# Patient Record
Sex: FEMALE | Race: WHITE | NOT HISPANIC OR LATINO | Employment: OTHER | URBAN - METROPOLITAN AREA
[De-identification: names, ages, dates, MRNs, and addresses within clinical notes are randomized per-mention and may not be internally consistent; named-entity substitution may affect disease eponyms.]

---

## 2017-01-25 ENCOUNTER — APPOINTMENT (OUTPATIENT)
Dept: GENERAL RADIOLOGY | Facility: HOSPITAL | Age: 57
End: 2017-01-25

## 2017-01-25 ENCOUNTER — HOSPITAL ENCOUNTER (EMERGENCY)
Facility: HOSPITAL | Age: 57
Discharge: HOME | End: 2017-01-26
Attending: EMERGENCY MEDICINE | Admitting: EMERGENCY MEDICINE
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
## 2017-01-25 DIAGNOSIS — S16.1XXA CERVICAL STRAIN, INITIAL ENCOUNTER: Primary | ICD-10-CM

## 2017-01-25 PROCEDURE — 99283 EMERGENCY DEPT VISIT LOW MDM: CPT

## 2017-01-25 PROCEDURE — 72050 X-RAY EXAM NECK SPINE 4/5VWS: CPT

## 2017-01-25 RX ORDER — CYCLOBENZAPRINE HCL 10 MG
10 TABLET ORAL ONCE
Status: COMPLETED | OUTPATIENT
Start: 2017-01-25 | End: 2017-01-25

## 2017-01-25 RX ORDER — CYCLOBENZAPRINE HCL 10 MG
10 TABLET ORAL 3 TIMES DAILY PRN
Qty: 21 TABLET | Refills: 0 | Status: SHIPPED | OUTPATIENT
Start: 2017-01-25

## 2017-01-25 RX ORDER — OXYCODONE HYDROCHLORIDE AND ACETAMINOPHEN 5; 325 MG/1; MG/1
1 TABLET ORAL ONCE
Status: COMPLETED | OUTPATIENT
Start: 2017-01-25 | End: 2017-01-25

## 2017-01-25 RX ORDER — ALPRAZOLAM 0.5 MG/1
TABLET ORAL AS NEEDED
COMMUNITY

## 2017-01-25 RX ADMIN — OXYCODONE HYDROCHLORIDE AND ACETAMINOPHEN 1 TABLET: 5; 325 TABLET ORAL at 22:59

## 2017-01-25 RX ADMIN — CYCLOBENZAPRINE HYDROCHLORIDE 10 MG: 10 TABLET, FILM COATED ORAL at 22:59

## 2017-01-26 VITALS
HEIGHT: 62 IN | DIASTOLIC BLOOD PRESSURE: 79 MMHG | HEART RATE: 51 BPM | RESPIRATION RATE: 16 BRPM | OXYGEN SATURATION: 96 % | SYSTOLIC BLOOD PRESSURE: 139 MMHG | WEIGHT: 106 LBS | TEMPERATURE: 97.2 F | BODY MASS INDEX: 19.51 KG/M2

## 2017-01-26 NOTE — DISCHARGE INSTRUCTIONS
PLEASE READ AND REVIEW ALL DISCHARGE INSTRUCTIONS.     Please follow up with your primary care physician for any further evaluation/treatment and further management of your blood pressure.     Recheck in emergency department for any worsening and/or concerning symptoms.    Take all prescribed medicine as written and continue chronic medication.    Warm heat to neck.  Gentle massage in 1-2 weeks.
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
## 2019-01-10 ENCOUNTER — HOSPITAL ENCOUNTER (EMERGENCY)
Facility: HOSPITAL | Age: 59
Discharge: HOME | End: 2019-01-10
Attending: EMERGENCY MEDICINE | Admitting: EMERGENCY MEDICINE

## 2019-01-10 ENCOUNTER — APPOINTMENT (OUTPATIENT)
Dept: GENERAL RADIOLOGY | Facility: HOSPITAL | Age: 59
End: 2019-01-10

## 2019-01-10 VITALS
TEMPERATURE: 102 F | HEIGHT: 62 IN | HEART RATE: 89 BPM | DIASTOLIC BLOOD PRESSURE: 74 MMHG | SYSTOLIC BLOOD PRESSURE: 115 MMHG | RESPIRATION RATE: 20 BRPM | WEIGHT: 118 LBS | OXYGEN SATURATION: 97 % | BODY MASS INDEX: 21.71 KG/M2

## 2019-01-10 DIAGNOSIS — J40 BRONCHITIS: ICD-10-CM

## 2019-01-10 DIAGNOSIS — N30.01 ACUTE CYSTITIS WITH HEMATURIA: Primary | ICD-10-CM

## 2019-01-10 LAB
ALBUMIN SERPL-MCNC: 3.7 G/DL (ref 3.5–5.2)
ALBUMIN/GLOB SERPL: 1.2 G/DL
ALP SERPL-CCNC: 79 U/L (ref 39–117)
ALT SERPL W P-5'-P-CCNC: 49 U/L (ref 1–33)
ANION GAP SERPL CALCULATED.3IONS-SCNC: 12.6 MMOL/L
AST SERPL-CCNC: 42 U/L (ref 1–32)
BACTERIA UR QL AUTO: ABNORMAL /HPF
BASOPHILS # BLD AUTO: 0.01 10*3/MM3 (ref 0–0.2)
BASOPHILS NFR BLD AUTO: 0.1 % (ref 0–1.5)
BILIRUB SERPL-MCNC: 0.5 MG/DL (ref 0.1–1.2)
BILIRUB UR QL STRIP: NEGATIVE
BUN BLD-MCNC: 12 MG/DL (ref 6–20)
BUN/CREAT SERPL: 18.5 (ref 7–25)
CALCIUM SPEC-SCNC: 9.3 MG/DL (ref 8.6–10.5)
CHLORIDE SERPL-SCNC: 99 MMOL/L (ref 98–107)
CLARITY UR: ABNORMAL
CO2 SERPL-SCNC: 24.4 MMOL/L (ref 22–29)
COLOR UR: YELLOW
CREAT BLD-MCNC: 0.65 MG/DL (ref 0.57–1)
D-LACTATE SERPL-SCNC: 1.1 MMOL/L (ref 0.5–2)
DEPRECATED RDW RBC AUTO: 49.8 FL (ref 37–54)
EOSINOPHIL # BLD AUTO: 0.01 10*3/MM3 (ref 0–0.7)
EOSINOPHIL NFR BLD AUTO: 0.1 % (ref 0.3–6.2)
ERYTHROCYTE [DISTWIDTH] IN BLOOD BY AUTOMATED COUNT: 13.5 % (ref 11.7–13)
FLUAV AG NPH QL: NEGATIVE
FLUBV AG NPH QL IA: NEGATIVE
GFR SERPL CREATININE-BSD FRML MDRD: 94 ML/MIN/1.73
GLOBULIN UR ELPH-MCNC: 3.2 GM/DL
GLUCOSE BLD-MCNC: 108 MG/DL (ref 65–99)
GLUCOSE UR STRIP-MCNC: NEGATIVE MG/DL
HCT VFR BLD AUTO: 39.3 % (ref 35.6–45.5)
HGB BLD-MCNC: 12.6 G/DL (ref 11.9–15.5)
HGB UR QL STRIP.AUTO: ABNORMAL
HOLD SPECIMEN: NORMAL
HOLD SPECIMEN: NORMAL
HYALINE CASTS UR QL AUTO: ABNORMAL /LPF
IMM GRANULOCYTES # BLD AUTO: 0.02 10*3/MM3 (ref 0–0.03)
IMM GRANULOCYTES NFR BLD AUTO: 0.2 % (ref 0–0.5)
KETONES UR QL STRIP: ABNORMAL
LEUKOCYTE ESTERASE UR QL STRIP.AUTO: ABNORMAL
LYMPHOCYTES # BLD AUTO: 0.88 10*3/MM3 (ref 0.9–4.8)
LYMPHOCYTES NFR BLD AUTO: 8.2 % (ref 19.6–45.3)
MCH RBC QN AUTO: 32.4 PG (ref 26.9–32)
MCHC RBC AUTO-ENTMCNC: 32.1 G/DL (ref 32.4–36.3)
MCV RBC AUTO: 101 FL (ref 80.5–98.2)
MONOCYTES # BLD AUTO: 0.66 10*3/MM3 (ref 0.2–1.2)
MONOCYTES NFR BLD AUTO: 6.2 % (ref 5–12)
NEUTROPHILS # BLD AUTO: 9.11 10*3/MM3 (ref 1.9–8.1)
NEUTROPHILS NFR BLD AUTO: 85.2 % (ref 42.7–76)
NITRITE UR QL STRIP: POSITIVE
PH UR STRIP.AUTO: 5.5 [PH] (ref 5–8)
PLATELET # BLD AUTO: 302 10*3/MM3 (ref 140–500)
PMV BLD AUTO: 9.2 FL (ref 6–12)
POTASSIUM BLD-SCNC: 3.5 MMOL/L (ref 3.5–5.2)
PROT SERPL-MCNC: 6.9 G/DL (ref 6–8.5)
PROT UR QL STRIP: ABNORMAL
RBC # BLD AUTO: 3.89 10*6/MM3 (ref 3.9–5.2)
RBC # UR: ABNORMAL /HPF
REF LAB TEST METHOD: ABNORMAL
SODIUM BLD-SCNC: 136 MMOL/L (ref 136–145)
SP GR UR STRIP: 1.02 (ref 1–1.03)
SQUAMOUS #/AREA URNS HPF: ABNORMAL /HPF
UNIDENT CRYS URNS QL MICRO: ABNORMAL /HPF
UROBILINOGEN UR QL STRIP: ABNORMAL
WBC NRBC COR # BLD: 10.69 10*3/MM3 (ref 4.5–10.7)
WBC UR QL AUTO: ABNORMAL /HPF
WHOLE BLOOD HOLD SPECIMEN: NORMAL
WHOLE BLOOD HOLD SPECIMEN: NORMAL

## 2019-01-10 PROCEDURE — 96375 TX/PRO/DX INJ NEW DRUG ADDON: CPT

## 2019-01-10 PROCEDURE — 25010000002 LEVOFLOXACIN PER 250 MG: Performed by: PHYSICIAN ASSISTANT

## 2019-01-10 PROCEDURE — 71046 X-RAY EXAM CHEST 2 VIEWS: CPT

## 2019-01-10 PROCEDURE — 87040 BLOOD CULTURE FOR BACTERIA: CPT | Performed by: PHYSICIAN ASSISTANT

## 2019-01-10 PROCEDURE — 96365 THER/PROPH/DIAG IV INF INIT: CPT

## 2019-01-10 PROCEDURE — 87804 INFLUENZA ASSAY W/OPTIC: CPT | Performed by: PHYSICIAN ASSISTANT

## 2019-01-10 PROCEDURE — 99284 EMERGENCY DEPT VISIT MOD MDM: CPT

## 2019-01-10 PROCEDURE — 25010000002 KETOROLAC TROMETHAMINE PER 15 MG: Performed by: NURSE PRACTITIONER

## 2019-01-10 PROCEDURE — 80053 COMPREHEN METABOLIC PANEL: CPT | Performed by: PHYSICIAN ASSISTANT

## 2019-01-10 PROCEDURE — 36415 COLL VENOUS BLD VENIPUNCTURE: CPT | Performed by: PHYSICIAN ASSISTANT

## 2019-01-10 PROCEDURE — 96361 HYDRATE IV INFUSION ADD-ON: CPT

## 2019-01-10 PROCEDURE — 85025 COMPLETE CBC W/AUTO DIFF WBC: CPT | Performed by: PHYSICIAN ASSISTANT

## 2019-01-10 PROCEDURE — 83605 ASSAY OF LACTIC ACID: CPT | Performed by: PHYSICIAN ASSISTANT

## 2019-01-10 PROCEDURE — 96366 THER/PROPH/DIAG IV INF ADDON: CPT

## 2019-01-10 PROCEDURE — 81001 URINALYSIS AUTO W/SCOPE: CPT | Performed by: PHYSICIAN ASSISTANT

## 2019-01-10 RX ORDER — KETOROLAC TROMETHAMINE 15 MG/ML
15 INJECTION, SOLUTION INTRAMUSCULAR; INTRAVENOUS ONCE
Status: COMPLETED | OUTPATIENT
Start: 2019-01-10 | End: 2019-01-10

## 2019-01-10 RX ORDER — LEVOFLOXACIN 5 MG/ML
750 INJECTION, SOLUTION INTRAVENOUS ONCE
Status: COMPLETED | OUTPATIENT
Start: 2019-01-10 | End: 2019-01-10

## 2019-01-10 RX ORDER — PHENAZOPYRIDINE HYDROCHLORIDE 200 MG/1
200 TABLET, FILM COATED ORAL 3 TIMES DAILY PRN
Qty: 6 TABLET | Refills: 0 | Status: SHIPPED | OUTPATIENT
Start: 2019-01-10

## 2019-01-10 RX ORDER — ACETAMINOPHEN 500 MG
1000 TABLET ORAL ONCE
Status: COMPLETED | OUTPATIENT
Start: 2019-01-10 | End: 2019-01-10

## 2019-01-10 RX ORDER — BENZONATATE 100 MG/1
100-200 CAPSULE ORAL EVERY 8 HOURS PRN
Qty: 30 CAPSULE | Refills: 0 | Status: SHIPPED | OUTPATIENT
Start: 2019-01-10

## 2019-01-10 RX ORDER — LEVOFLOXACIN 750 MG/1
750 TABLET ORAL DAILY
Qty: 4 TABLET | Refills: 0 | Status: SHIPPED | OUTPATIENT
Start: 2019-01-10 | End: 2019-01-14

## 2019-01-10 RX ADMIN — LEVOFLOXACIN 750 MG: 5 INJECTION, SOLUTION INTRAVENOUS at 17:04

## 2019-01-10 RX ADMIN — KETOROLAC TROMETHAMINE 15 MG: 15 INJECTION, SOLUTION INTRAMUSCULAR; INTRAVENOUS at 17:11

## 2019-01-10 RX ADMIN — ACETAMINOPHEN 1000 MG: 500 TABLET, FILM COATED ORAL at 14:01

## 2019-01-10 RX ADMIN — SODIUM CHLORIDE, POTASSIUM CHLORIDE, SODIUM LACTATE AND CALCIUM CHLORIDE 1000 ML: 600; 310; 30; 20 INJECTION, SOLUTION INTRAVENOUS at 15:42

## 2019-01-10 NOTE — ED PROVIDER NOTES
Pt is a 58 y.o. female who presents to the ED complaining of dry cough that started 5 weeks ago and worsened recently. Pt also complains bilateral back pain, nausea, generalized body aches, dysuria and urinary frequency for 5 days. Pt denies rash, vomiting, and sore throat.       On exam,  HENT: oropharynx is normal  Neck: mild cervical lymphadenopathy  Cardiovascular: RRR, no murmur  Pulmonary: CTAB  Abdomen: normal active bowel sounds, soft, non-tender, non-distened  Musculoskeletal: no edema or calf tenderness      Imaging (CXR-negative acute) reviewed.     Plan: Review lab work       MD ATTESTATION NOTE    The JONATHAN and I have discussed this patient's history, physical exam, and treatment plan.  I have reviewed the documentation and personally had a face to face interaction with the patient. I affirm the documentation and agree with the treatment and plan.  The attached note describes my personal findings.      Documentation assistance provided by rand Shine for . Information recorded by the scribe was done at my direction and has been verified and validated by me.             Sis Shine  01/10/19 7487       Rishabh Winkler MD  01/10/19 7870

## 2019-01-10 NOTE — ED PROVIDER NOTES
EMERGENCY DEPARTMENT ENCOUNTER    Room Number:  30/30  Date seen:  1/10/2019  Time seen: 2:22 PM  PCP: Esme Hatch MD    HPI:  Chief complaint: Cough  Context:Lay Barker is a 58 y.o. female who presents to the ED with c/o persistent cough that began 5 weeks ago and worsened in the past 2-3 days. Pt c/o subjective fever and chills, dysuria, urinary frequency, nausea, SOA with coughing, and lower back pain. Pt denies vomiting, diarrhea, and CP. Pt saw her PCP when her cough began and was dx with bronchitis. She was prescribed antibiotics but did not finish them due to an allergic reaction.      Onset: gradual  Duration: 5 weeks, worse past 2-3 days  Timing: constant  Character: persistent cough  Aggravating Factors: none stated  Alleviating Factors: none stated  Severity: moderate    ALLERGIES  Cefdinir; Hydrocodone; Penicillins; and Sulfa antibiotics    PAST MEDICAL HISTORY  Active Ambulatory Problems     Diagnosis Date Noted   • No Active Ambulatory Problems     Resolved Ambulatory Problems     Diagnosis Date Noted   • No Resolved Ambulatory Problems     Past Medical History:   Diagnosis Date   • Anxiety    • Depression        PAST SURGICAL HISTORY  Past Surgical History:   Procedure Laterality Date   • CHOLECYSTECTOMY         FAMILY HISTORY  Family History   Problem Relation Age of Onset   • Alcohol abuse Father        SOCIAL HISTORY  Social History     Socioeconomic History   • Marital status:      Spouse name: Not on file   • Number of children: Not on file   • Years of education: Not on file   • Highest education level: Not on file   Social Needs   • Financial resource strain: Not on file   • Food insecurity - worry: Not on file   • Food insecurity - inability: Not on file   • Transportation needs - medical: Not on file   • Transportation needs - non-medical: Not on file   Occupational History   • Not on file   Tobacco Use   • Smoking status: Former Smoker   Substance and Sexual  Activity   • Alcohol use: Yes     Comment: Drank a bottle of wine last night   • Drug use: No   • Sexual activity: Defer   Other Topics Concern   • Not on file   Social History Narrative   • Not on file       REVIEW OF SYSTEMS  Review of Systems   Constitutional: Positive for chills and fever.   HENT: Negative.    Eyes: Negative.    Respiratory: Positive for cough. Negative for shortness of breath (with cough).    Cardiovascular: Negative for chest pain.   Gastrointestinal: Positive for nausea. Negative for abdominal pain, diarrhea and vomiting.   Genitourinary: Positive for dysuria and frequency.   Musculoskeletal: Positive for back pain (lower).   Skin: Negative.    Neurological: Negative.    Psychiatric/Behavioral: Negative.        PHYSICAL EXAM  ED Triage Vitals   Temp Heart Rate Resp BP SpO2   01/10/19 1310 01/10/19 1310 01/10/19 1310 01/10/19 1347 01/10/19 1310   (!) 102.4 °F (39.1 °C) 103 20 128/74 100 %      Temp src Heart Rate Source Patient Position BP Location FiO2 (%)   01/10/19 1310 01/10/19 1310 -- 01/10/19 1347 --   Tympanic Monitor  Right arm      Physical Exam   Constitutional: She is oriented to person, place, and time and well-developed, well-nourished, and in no distress.   HENT:   Head: Normocephalic and atraumatic.   Right Ear: External ear normal.   Left Ear: External ear normal.   Nose: Nose normal.   Mouth/Throat: Oropharynx is clear and moist. No oropharyngeal exudate, posterior oropharyngeal edema or posterior oropharyngeal erythema.   Eyes: Conjunctivae are normal.   Neck: Normal range of motion.   Cardiovascular: Normal rate and regular rhythm.   Pulmonary/Chest: Effort normal and breath sounds normal. She has no wheezes. She has no rhonchi. She has no rales.   Abdominal:   Normal bowel sounds  Soft  Nontender  Nondistended  No rebound, guarding, or rigidity  No appreciable organomegaly  Left CVA tenderness   Musculoskeletal: Normal range of motion.   Neurological: She is alert and  oriented to person, place, and time.   Skin: Skin is warm and dry.   Psychiatric: Affect normal.   Nursing note and vitals reviewed.      LAB RESULTS  Recent Results (from the past 24 hour(s))   CBC Auto Differential    Collection Time: 01/10/19  3:19 PM   Result Value Ref Range    WBC 10.69 4.50 - 10.70 10*3/mm3    RBC 3.89 (L) 3.90 - 5.20 10*6/mm3    Hemoglobin 12.6 11.9 - 15.5 g/dL    Hematocrit 39.3 35.6 - 45.5 %    .0 (H) 80.5 - 98.2 fL    MCH 32.4 (H) 26.9 - 32.0 pg    MCHC 32.1 (L) 32.4 - 36.3 g/dL    RDW 13.5 (H) 11.7 - 13.0 %    RDW-SD 49.8 37.0 - 54.0 fl    MPV 9.2 6.0 - 12.0 fL    Platelets 302 140 - 500 10*3/mm3    Neutrophil % 85.2 (H) 42.7 - 76.0 %    Lymphocyte % 8.2 (L) 19.6 - 45.3 %    Monocyte % 6.2 5.0 - 12.0 %    Eosinophil % 0.1 (L) 0.3 - 6.2 %    Basophil % 0.1 0.0 - 1.5 %    Immature Grans % 0.2 0.0 - 0.5 %    Neutrophils, Absolute 9.11 (H) 1.90 - 8.10 10*3/mm3    Lymphocytes, Absolute 0.88 (L) 0.90 - 4.80 10*3/mm3    Monocytes, Absolute 0.66 0.20 - 1.20 10*3/mm3    Eosinophils, Absolute 0.01 0.00 - 0.70 10*3/mm3    Basophils, Absolute 0.01 0.00 - 0.20 10*3/mm3    Immature Grans, Absolute 0.02 0.00 - 0.03 10*3/mm3       I ordered the above labs and reviewed the results    RADIOLOGY  XR Chest 2 View   Xr Chest 2 View    Result Date: 1/10/2019  Narrative: TWO-VIEW CHEST  HISTORY: Persistent cough. Fever and chills.  FINDINGS:  The lungs are well-expanded and clear and the heart and hilar structures are normal. There is no acute disease or change from 08/11/2013.              I ordered the above noted radiological studies and reviewed the images on the PACS system.     MEDICATIONS GIVEN IN ER  Medications   acetaminophen (TYLENOL) tablet 1,000 mg (1,000 mg Oral Given 1/10/19 1401)   lactated ringers bolus 1,000 mL (0 mL Intravenous Stopped 1/10/19 1700)   levoFLOXacin (LEVAQUIN) 750 mg/150 mL D5W (premix) (LEVAQUIN) 750 mg (0 mg Intravenous Stopped 1/10/19 1840)   ketorolac (TORADOL)  "injection 15 mg (15 mg Intravenous Given 1/10/19 1711)       PROCEDURES  Procedures      PROGRESS AND CONSULTS  3:25 PM  Reviewed pt's history and workup with Dr. Winkler.  After a bedside evaluation; Dr Winkler agrees with the plan of care    1640 I rechecked the patient. X-ray ok, u/a shows UTI. Vitals stable, she's 98% on room air. Will give dose of levaquin in ER and discharge home on same due to allergies. She is agreeable with this plan. Tolerating po and ok for outpatient treatment. Return precautions discussed.       Disposition vitals:  /69 (BP Location: Right arm)   Pulse 87   Temp 99.6 °F (37.6 °C) (Oral)   Resp 20   Ht 157.5 cm (62\")   Wt 53.5 kg (118 lb)   SpO2 96%   BMI 21.58 kg/m²       DIAGNOSIS  Final diagnoses:   Acute cystitis with hematuria   Bronchitis       FOLLOW UP   Esme Hatch MD  [address] [address]  [phone]    In 2 days        RX     Medication List      New Prescriptions    benzonatate 100 MG capsule  Commonly known as:  TESSALON  Take 1-2 capsules by mouth Every 8 (Eight) Hours As Needed for Cough.     phenazopyridine 200 MG tablet  Commonly known as:  PYRIDIUM  Take 1 tablet by mouth 3 (Three) Times a Day As Needed for bladder spasms.        ASK your doctor about these medications    levoFLOXacin 750 MG tablet  Commonly known as:  LEVAQUIN  Take 1 tablet by mouth Daily for 4 days. First dose given in ER. Start   1/11/2018  Ask about: Should I take this medication?            Documentation assistance provided by rand Hogan for Leni Powell PA-C.  Information recorded by the rand was done at my direction and has been verified and validated by me.         Dionna Hogan  01/10/19 1600       Leni Powell PA  01/15/19 0900    "

## 2019-01-10 NOTE — DISCHARGE INSTRUCTIONS
Stay well-hydrated.  Start the antibiotics tomorrow, first dose given in ER.  Take them to completion.  Follow-up with your primary care provider in 2 days.  Reurn to the emergency department for vomiting and unable to keep down your medications, fever that persists for more than another 48 hours, abdominal pain, shortness of breath, new or worsening symptoms, any concerns.

## 2019-01-15 LAB
BACTERIA SPEC AEROBE CULT: NORMAL
BACTERIA SPEC AEROBE CULT: NORMAL

## 2019-08-24 ENCOUNTER — HOSPITAL ENCOUNTER (EMERGENCY)
Facility: HOSPITAL | Age: 59
Discharge: HOME | End: 2019-08-25
Attending: EMERGENCY MEDICINE | Admitting: EMERGENCY MEDICINE

## 2019-08-24 ENCOUNTER — APPOINTMENT (OUTPATIENT)
Dept: GENERAL RADIOLOGY | Facility: HOSPITAL | Age: 59
End: 2019-08-24

## 2019-08-24 DIAGNOSIS — M79.672 ACUTE FOOT PAIN, LEFT: Primary | ICD-10-CM

## 2019-08-24 PROCEDURE — 73630 X-RAY EXAM OF FOOT: CPT

## 2019-08-24 PROCEDURE — 99283 EMERGENCY DEPT VISIT LOW MDM: CPT

## 2019-08-24 RX ORDER — IBUPROFEN 800 MG/1
800 TABLET ORAL ONCE
Status: COMPLETED | OUTPATIENT
Start: 2019-08-24 | End: 2019-08-24

## 2019-08-24 RX ADMIN — IBUPROFEN 800 MG: 800 TABLET, FILM COATED ORAL at 23:15

## 2019-08-25 VITALS
WEIGHT: 122 LBS | DIASTOLIC BLOOD PRESSURE: 76 MMHG | RESPIRATION RATE: 16 BRPM | OXYGEN SATURATION: 97 % | TEMPERATURE: 97.7 F | HEIGHT: 62 IN | SYSTOLIC BLOOD PRESSURE: 96 MMHG | HEART RATE: 88 BPM | BODY MASS INDEX: 22.45 KG/M2

## 2019-08-26 ENCOUNTER — OFFICE VISIT (OUTPATIENT)
Dept: ORTHOPEDIC SURGERY | Facility: CLINIC | Age: 59
End: 2019-08-26

## 2019-08-26 VITALS — HEIGHT: 62 IN | TEMPERATURE: 97.9 F | BODY MASS INDEX: 22.45 KG/M2 | WEIGHT: 122 LBS

## 2019-08-26 DIAGNOSIS — M77.42 METATARSALGIA OF LEFT FOOT: Primary | ICD-10-CM

## 2019-08-26 PROCEDURE — 99204 OFFICE O/P NEW MOD 45 MIN: CPT | Performed by: ORTHOPAEDIC SURGERY

## 2019-08-26 RX ORDER — METHYLPREDNISOLONE 4 MG/1
TABLET ORAL
Qty: 21 TABLET | Refills: 0 | Status: SHIPPED | OUTPATIENT
Start: 2019-08-26

## 2019-08-26 RX ORDER — MELOXICAM 15 MG/1
TABLET ORAL
Qty: 30 TABLET | Refills: 1 | Status: SHIPPED | OUTPATIENT
Start: 2019-09-01
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
## 2019-10-14 ENCOUNTER — APPOINTMENT (OUTPATIENT)
Dept: WOMENS IMAGING | Facility: HOSPITAL | Age: 59
End: 2019-10-14

## 2019-10-14 PROCEDURE — 77080 DXA BONE DENSITY AXIAL: CPT | Performed by: RADIOLOGY

## 2020-12-16 ENCOUNTER — OFFICE VISIT (OUTPATIENT)
Dept: OBSTETRICS AND GYNECOLOGY | Facility: CLINIC | Age: 60
End: 2020-12-16

## 2020-12-16 VITALS
SYSTOLIC BLOOD PRESSURE: 133 MMHG | DIASTOLIC BLOOD PRESSURE: 75 MMHG | BODY MASS INDEX: 23.89 KG/M2 | HEIGHT: 62 IN | WEIGHT: 129.8 LBS

## 2020-12-16 DIAGNOSIS — Z01.411 ENCOUNTER FOR GYNECOLOGICAL EXAMINATION WITH ABNORMAL FINDING: Primary | ICD-10-CM

## 2020-12-16 DIAGNOSIS — Z12.39 ENCOUNTER FOR BREAST CANCER SCREENING USING NON-MAMMOGRAM MODALITY: ICD-10-CM

## 2020-12-16 DIAGNOSIS — N95.2 ATROPHIC VAGINITIS: ICD-10-CM

## 2020-12-16 LAB
DEVELOPER EXPIRATION DATE: ABNORMAL
DEVELOPER LOT NUMBER: ABNORMAL
EXPIRATION DATE: ABNORMAL
FECAL OCCULT BLOOD SCREEN, POC: POSITIVE
Lab: ABNORMAL
NEGATIVE CONTROL: NEGATIVE
POSITIVE CONTROL: POSITIVE

## 2020-12-16 PROCEDURE — 82274 ASSAY TEST FOR BLOOD FECAL: CPT | Performed by: OBSTETRICS & GYNECOLOGY

## 2020-12-16 PROCEDURE — 99396 PREV VISIT EST AGE 40-64: CPT | Performed by: OBSTETRICS & GYNECOLOGY

## 2020-12-16 RX ORDER — CONJUGATED ESTROGENS 0.62 MG/G
CREAM VAGINAL
Qty: 30 G | Refills: 4 | Status: SHIPPED | OUTPATIENT
Start: 2020-12-16

## 2020-12-16 RX ORDER — BACLOFEN 20 MG/1
20 TABLET ORAL NIGHTLY PRN
COMMUNITY
Start: 2020-12-09

## 2020-12-18 LAB
CONV .: NORMAL
CYTOLOGIST CVX/VAG CYTO: NORMAL
CYTOLOGY CVX/VAG DOC CYTO: NORMAL
CYTOLOGY CVX/VAG DOC THIN PREP: NORMAL
DX ICD CODE: NORMAL
HIV 1 & 2 AB SER-IMP: NORMAL
OTHER STN SPEC: NORMAL
STAT OF ADQ CVX/VAG CYTO-IMP: NORMAL

## 2020-12-23 ENCOUNTER — PROCEDURE VISIT (OUTPATIENT)
Dept: OBSTETRICS AND GYNECOLOGY | Facility: CLINIC | Age: 60
End: 2020-12-23

## 2020-12-23 ENCOUNTER — APPOINTMENT (OUTPATIENT)
Dept: WOMENS IMAGING | Facility: HOSPITAL | Age: 60
End: 2020-12-23

## 2020-12-23 VITALS
DIASTOLIC BLOOD PRESSURE: 88 MMHG | DIASTOLIC BLOOD PRESSURE: 66 MMHG | SYSTOLIC BLOOD PRESSURE: 122 MMHG | SYSTOLIC BLOOD PRESSURE: 96 MMHG | HEART RATE: 62 BPM | HEART RATE: 54 BPM | OXYGEN SATURATION: 95 % | DIASTOLIC BLOOD PRESSURE: 64 MMHG | HEART RATE: 67 BPM | RESPIRATION RATE: 11 BRPM | OXYGEN SATURATION: 100 % | HEART RATE: 65 BPM | RESPIRATION RATE: 17 BRPM | HEART RATE: 75 BPM | SYSTOLIC BLOOD PRESSURE: 125 MMHG | DIASTOLIC BLOOD PRESSURE: 77 MMHG | DIASTOLIC BLOOD PRESSURE: 74 MMHG | SYSTOLIC BLOOD PRESSURE: 100 MMHG | DIASTOLIC BLOOD PRESSURE: 58 MMHG | RESPIRATION RATE: 14 BRPM | SYSTOLIC BLOOD PRESSURE: 130 MMHG | RESPIRATION RATE: 22 BRPM | SYSTOLIC BLOOD PRESSURE: 127 MMHG | HEART RATE: 71 BPM | TEMPERATURE: 97.1 F | RESPIRATION RATE: 12 BRPM | SYSTOLIC BLOOD PRESSURE: 118 MMHG | HEART RATE: 60 BPM | SYSTOLIC BLOOD PRESSURE: 82 MMHG | HEART RATE: 64 BPM | HEIGHT: 62 IN | TEMPERATURE: 97.2 F | OXYGEN SATURATION: 98 % | SYSTOLIC BLOOD PRESSURE: 117 MMHG | SYSTOLIC BLOOD PRESSURE: 106 MMHG | HEART RATE: 70 BPM | DIASTOLIC BLOOD PRESSURE: 69 MMHG | DIASTOLIC BLOOD PRESSURE: 75 MMHG | SYSTOLIC BLOOD PRESSURE: 131 MMHG | DIASTOLIC BLOOD PRESSURE: 50 MMHG | DIASTOLIC BLOOD PRESSURE: 73 MMHG | DIASTOLIC BLOOD PRESSURE: 54 MMHG | WEIGHT: 125 LBS | SYSTOLIC BLOOD PRESSURE: 102 MMHG | RESPIRATION RATE: 18 BRPM | OXYGEN SATURATION: 99 % | OXYGEN SATURATION: 97 %

## 2020-12-23 DIAGNOSIS — Z12.31 VISIT FOR SCREENING MAMMOGRAM: Primary | ICD-10-CM

## 2020-12-23 PROCEDURE — 77063 BREAST TOMOSYNTHESIS BI: CPT | Performed by: OBSTETRICS & GYNECOLOGY

## 2020-12-23 PROCEDURE — 77067 SCR MAMMO BI INCL CAD: CPT | Performed by: OBSTETRICS & GYNECOLOGY

## 2020-12-23 PROCEDURE — 77067 SCR MAMMO BI INCL CAD: CPT | Performed by: RADIOLOGY

## 2020-12-23 PROCEDURE — 77063 BREAST TOMOSYNTHESIS BI: CPT | Performed by: RADIOLOGY

## 2020-12-28 ENCOUNTER — AMBULATORY SURGICAL CENTER (OUTPATIENT)
Dept: URBAN - METROPOLITAN AREA SURGERY 17 | Facility: SURGERY | Age: 60
End: 2020-12-28

## 2020-12-28 ENCOUNTER — OFFICE (OUTPATIENT)
Dept: URBAN - METROPOLITAN AREA PATHOLOGY 4 | Facility: PATHOLOGY | Age: 60
End: 2020-12-28

## 2020-12-28 DIAGNOSIS — K62.89 OTHER SPECIFIED DISEASES OF ANUS AND RECTUM: ICD-10-CM

## 2020-12-28 DIAGNOSIS — R19.5 OTHER FECAL ABNORMALITIES: ICD-10-CM

## 2020-12-28 DIAGNOSIS — R19.7 DIARRHEA, UNSPECIFIED: ICD-10-CM

## 2020-12-28 LAB
GI HISTOLOGY: A. UNSPECIFIED: (no result)
GI HISTOLOGY: B. SELECT: (no result)
GI HISTOLOGY: PDF REPORT: (no result)

## 2020-12-28 PROCEDURE — 88305 TISSUE EXAM BY PATHOLOGIST: CPT | Performed by: INTERNAL MEDICINE

## 2020-12-28 PROCEDURE — 45380 COLONOSCOPY AND BIOPSY: CPT | Performed by: INTERNAL MEDICINE

## 2021-03-22 ENCOUNTER — BULK ORDERING (OUTPATIENT)
Dept: CASE MANAGEMENT | Facility: OTHER | Age: 61
End: 2021-03-22

## 2021-03-22 DIAGNOSIS — Z23 IMMUNIZATION DUE: ICD-10-CM

## 2022-01-12 ENCOUNTER — APPOINTMENT (OUTPATIENT)
Dept: WOMENS IMAGING | Facility: HOSPITAL | Age: 62
End: 2022-01-12

## 2022-01-12 ENCOUNTER — PROCEDURE VISIT (OUTPATIENT)
Dept: OBSTETRICS AND GYNECOLOGY | Facility: CLINIC | Age: 62
End: 2022-01-12

## 2022-01-12 DIAGNOSIS — Z12.31 VISIT FOR SCREENING MAMMOGRAM: Primary | ICD-10-CM

## 2022-01-12 PROCEDURE — 77067 SCR MAMMO BI INCL CAD: CPT | Performed by: OBSTETRICS & GYNECOLOGY

## 2022-01-12 PROCEDURE — 77067 SCR MAMMO BI INCL CAD: CPT | Performed by: RADIOLOGY

## 2022-01-12 PROCEDURE — 77063 BREAST TOMOSYNTHESIS BI: CPT | Performed by: OBSTETRICS & GYNECOLOGY

## 2022-01-12 PROCEDURE — 77063 BREAST TOMOSYNTHESIS BI: CPT | Performed by: RADIOLOGY

## 2022-01-13 ENCOUNTER — TELEPHONE (OUTPATIENT)
Dept: OBSTETRICS AND GYNECOLOGY | Facility: CLINIC | Age: 62
End: 2022-01-13

## 2022-01-13 NOTE — TELEPHONE ENCOUNTER
Patient called and she would like to make sure that her mammogram results from yesterday will be sent to Dr. Baumann office once they are in. She left me his fax number it is [phone].

## 2022-04-27 ENCOUNTER — OFFICE (OUTPATIENT)
Dept: URBAN - METROPOLITAN AREA CLINIC 75 | Facility: CLINIC | Age: 62
End: 2022-04-27

## 2022-04-27 VITALS
OXYGEN SATURATION: 92 % | WEIGHT: 129 LBS | HEART RATE: 75 BPM | HEIGHT: 62 IN | SYSTOLIC BLOOD PRESSURE: 118 MMHG | DIASTOLIC BLOOD PRESSURE: 82 MMHG

## 2022-04-27 DIAGNOSIS — R19.7 DIARRHEA, UNSPECIFIED: ICD-10-CM

## 2022-04-27 DIAGNOSIS — M62.9 DISORDER OF MUSCLE, UNSPECIFIED: ICD-10-CM

## 2022-04-27 DIAGNOSIS — R15.9 FULL INCONTINENCE OF FECES: ICD-10-CM

## 2022-04-27 PROCEDURE — 99214 OFFICE O/P EST MOD 30 MIN: CPT | Performed by: INTERNAL MEDICINE

## 2022-04-27 RX ORDER — HYOSCYAMINE SULFATE 0.12 MG/1
TABLET ORAL
Qty: 60 | Refills: 10 | Status: ACTIVE
Start: 2022-04-27

## 2023-08-17 ENCOUNTER — OFFICE VISIT (OUTPATIENT)
Dept: OBSTETRICS AND GYNECOLOGY | Facility: CLINIC | Age: 63
End: 2023-08-17
Payer: COMMERCIAL

## 2023-08-17 ENCOUNTER — PROCEDURE VISIT (OUTPATIENT)
Dept: OBSTETRICS AND GYNECOLOGY | Facility: CLINIC | Age: 63
End: 2023-08-17
Payer: COMMERCIAL

## 2023-08-17 VITALS
BODY MASS INDEX: 23.92 KG/M2 | WEIGHT: 130 LBS | HEIGHT: 62 IN | SYSTOLIC BLOOD PRESSURE: 111 MMHG | DIASTOLIC BLOOD PRESSURE: 74 MMHG

## 2023-08-17 DIAGNOSIS — R15.9 FULL INCONTINENCE OF FECES: ICD-10-CM

## 2023-08-17 DIAGNOSIS — N63.20 MASS OF LEFT BREAST, UNSPECIFIED QUADRANT: ICD-10-CM

## 2023-08-17 DIAGNOSIS — M85.80 OSTEOPENIA, UNSPECIFIED LOCATION: ICD-10-CM

## 2023-08-17 DIAGNOSIS — Z01.411 ENCOUNTER FOR GYNECOLOGICAL EXAMINATION WITH ABNORMAL FINDING: Primary | ICD-10-CM

## 2023-08-17 DIAGNOSIS — Z12.31 VISIT FOR SCREENING MAMMOGRAM: Primary | ICD-10-CM

## 2023-08-17 NOTE — PROGRESS NOTES
Subjective    Chief Complaint   Patient presents with    Gynecologic Exam     AE, Mammo today       History of Present Illness    Lay Schaffer is a 63 y.o. female who presents for annual exam.  Non-smoker.  Mammogram today.  DEXA scan August 2022 osteopenia.  Previous total vaginal hysterectomy.  Patient is having problems with fecal incontinence.  She had a normal colonoscopy in 2020.  No bleeding or other issues.    Obstetric History:  OB History    No obstetric history on file.        Menstrual History:     No LMP recorded. Patient is postmenopausal.       Past Medical History:   Diagnosis Date    Anxiety     Depression     Endometriosis     Osteopenia     Ovarian cyst     Pelvic prolapse     Urinary tract infection      Family History   Problem Relation Age of Onset    Alcohol abuse Father     Stroke Mother         Born with ceberal brain hemorrhage     Social History     Tobacco Use   Smoking Status Former   Smokeless Tobacco Not on file         The following portions of the patient's history were reviewed and updated as appropriate: allergies, current medications, past family history, past medical history, past social history, past surgical history, and problem list.    Review of Systems   Constitutional: Negative.  Negative for fever and unexpected weight change.   HENT: Negative.     Respiratory:  Negative for shortness of breath and wheezing.    Cardiovascular:  Negative for chest pain, palpitations and leg swelling.   Gastrointestinal:  Negative for abdominal pain, anal bleeding and blood in stool.        Positive for fecal incontinence   Genitourinary:  Negative for dysuria, pelvic pain, urgency, vaginal bleeding, vaginal discharge and vaginal pain.   Skin: Negative.    Neurological: Negative.    Hematological: Negative.  Negative for adenopathy.   Psychiatric/Behavioral: Negative.  Negative for dysphoric mood. The patient is not nervous/anxious.           Objective   Physical Exam  Vitals  reviewed. Exam conducted with a chaperone present.   Constitutional:       Appearance: She is well-developed.   HENT:      Head: Normocephalic.   Eyes:      Pupils: Pupils are equal, round, and reactive to light.   Neck:      Thyroid: No thyromegaly.      Trachea: No tracheal deviation.   Cardiovascular:      Rate and Rhythm: Normal rate and regular rhythm.      Heart sounds: Normal heart sounds. No murmur heard.  Pulmonary:      Effort: Pulmonary effort is normal. No respiratory distress.      Breath sounds: Normal breath sounds.   Chest:   Breasts:     Right: Normal. No mass, nipple discharge or tenderness.      Left: Mass present. No nipple discharge or tenderness.          Comments: Patient has a large left breast mass, not present on the right breast, which patient feels may be secondary to fat injected at that site by plastic surgeon after removal of her implants.  It measures at least 5 or 6 cm in size.  It is nontender and freely mobile.  Abdominal:      Palpations: Abdomen is soft. There is no mass.      Tenderness: There is no abdominal tenderness.      Hernia: No hernia is present.   Genitourinary:     General: Normal vulva.      Labia:         Right: No tenderness or lesion.         Left: No tenderness or lesion.       Urethra: No prolapse or urethral lesion.      Vagina: Normal. No vaginal discharge.      Uterus: Absent.       Adnexa:         Right: No fullness.          Left: No fullness.        Rectum: Normal. No external hemorrhoid or internal hemorrhoid. Normal anal tone.      Comments: External genitalia normal.  Slightly decreased rectal tone  Lymphadenopathy:      Cervical: No cervical adenopathy.      Upper Body:      Right upper body: No axillary adenopathy.      Left upper body: No axillary adenopathy.   Skin:     General: Skin is warm and dry.      Findings: No rash.   Neurological:      Mental Status: She is alert and oriented to person, place, and time.   Psychiatric:         Behavior:  "Behavior normal.       /74   Ht 157.5 cm (62\")   Wt 59 kg (130 lb)   BMI 23.78 kg/mý     Assessment & Plan   Diagnoses and all orders for this visit:    1. Encounter for gynecological examination with abnormal finding (Primary)  -     IGP,rfx Aptima HPV All Pth    2. Osteopenia, unspecified location    3. Full incontinence of feces  -     Ambulatory Referral to Colorectal Surgery    4. Mass of left breast, unspecified quadrant        [facility]  She already had a screening mammogram today.  She will call after these results are back so we can further discuss whether breast surgery consultation is necessary.  We will also send her to colorectal surgery due to fecal incontinence.  She has had a colonoscopy.  She takes vitamin D3 for osteoporosis prevention.             "

## 2023-08-21 ENCOUNTER — TELEPHONE (OUTPATIENT)
Dept: OBSTETRICS AND GYNECOLOGY | Facility: CLINIC | Age: 63
End: 2023-08-21
Payer: COMMERCIAL

## 2023-08-21 NOTE — TELEPHONE ENCOUNTER
Pt called in stating that she is suppose to be sent to [facility] for Breast US.  When she contacted her insurance they stated that Dr. Ibarra and [facility] were not covered.  Explained that we do accept her insurance.  Will discuss with Dr. Ibarra to see if he still wants the Breast US since her screening mammogram returned benign.

## 2023-08-30 ENCOUNTER — TELEPHONE (OUTPATIENT)
Dept: SURGERY | Facility: CLINIC | Age: 63
End: 2023-08-30
Payer: COMMERCIAL

## 2023-08-30 DIAGNOSIS — N63.0 BREAST MASS IN FEMALE: Primary | ICD-10-CM

## 2023-08-30 NOTE — TELEPHONE ENCOUNTER
Pt states she would like to see a breast surgeon and would like to know who provider recommends please.

## 2023-09-01 ENCOUNTER — TELEPHONE (OUTPATIENT)
Dept: SURGERY | Facility: CLINIC | Age: 63
End: 2023-09-01
Payer: COMMERCIAL

## 2023-09-01 NOTE — TELEPHONE ENCOUNTER
New patient appointment scheduled with     9/26/2023 @ 8:30 am Arrival 15 minutes prior to appointment time.     New patient packet mailed. She expressed v/u of appointment date and time.

## 2023-09-20 PROBLEM — M25.476 SWELLING OF FIRST METATARSOPHALANGEAL (MTP) JOINT: Status: ACTIVE | Noted: 2023-09-20

## 2023-09-20 PROBLEM — M21.6X9 ACQUIRED CAVUS DEFORMITY OF FOOT: Status: ACTIVE | Noted: 2023-09-20

## 2023-09-20 PROBLEM — N80.9 ENDOMETRIOSIS: Status: ACTIVE | Noted: 2023-09-20

## 2023-09-20 PROBLEM — M79.672 PAIN IN LEFT FOOT: Status: ACTIVE | Noted: 2019-08-26

## 2023-09-20 PROBLEM — R50.9 FEBRILE: Status: ACTIVE | Noted: 2023-09-20

## 2023-09-20 PROBLEM — A77.41 EHRLICHIOSIS CHAFEENSIS: Status: ACTIVE | Noted: 2023-09-20

## 2023-09-20 PROBLEM — R53.83 FATIGUE: Status: ACTIVE | Noted: 2023-09-20

## 2023-09-20 PROBLEM — M79.10 MUSCLE ACHE: Status: ACTIVE | Noted: 2023-09-20
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
## 2023-09-20 RX ORDER — CYANOCOBALAMIN (VITAMIN B-12) 500 MCG
300 TABLET ORAL DAILY
COMMUNITY

## 2023-09-20 RX ORDER — IBUPROFEN 200 MG
200 TABLET ORAL EVERY 6 HOURS PRN
COMMUNITY

## 2023-09-20 RX ORDER — TURMERIC 400 MG
1 CAPSULE ORAL DAILY
COMMUNITY

## 2023-09-20 RX ORDER — DIPHENOXYLATE HYDROCHLORIDE AND ATROPINE SULFATE 2.5; .025 MG/1; MG/1
1 TABLET ORAL DAILY
COMMUNITY

## 2023-09-25 ENCOUNTER — OFFICE VISIT (OUTPATIENT)
Dept: SURGERY | Facility: CLINIC | Age: 63
End: 2023-09-25
Payer: COMMERCIAL

## 2023-09-25 VITALS
WEIGHT: 132.8 LBS | BODY MASS INDEX: 24.44 KG/M2 | HEART RATE: 71 BPM | HEIGHT: 62 IN | OXYGEN SATURATION: 98 % | SYSTOLIC BLOOD PRESSURE: 124 MMHG | DIASTOLIC BLOOD PRESSURE: 80 MMHG

## 2023-09-25 DIAGNOSIS — R15.2 INCONTINENCE OF FECES WITH FECAL URGENCY: Primary | ICD-10-CM

## 2023-09-25 DIAGNOSIS — R15.9 INCONTINENCE OF FECES WITH FECAL URGENCY: Primary | ICD-10-CM
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
## 2023-09-26 ENCOUNTER — OFFICE VISIT (OUTPATIENT)
Dept: SURGERY | Facility: CLINIC | Age: 63
End: 2023-09-26
Payer: COMMERCIAL

## 2023-09-26 VITALS
HEART RATE: 63 BPM | DIASTOLIC BLOOD PRESSURE: 78 MMHG | WEIGHT: 130 LBS | OXYGEN SATURATION: 98 % | HEIGHT: 62 IN | SYSTOLIC BLOOD PRESSURE: 128 MMHG | BODY MASS INDEX: 23.92 KG/M2

## 2023-09-26 DIAGNOSIS — Z98.890 HISTORY OF AUGMENTATION OF BOTH BREASTS: ICD-10-CM

## 2023-09-26 DIAGNOSIS — N63.24 MASS OF LOWER INNER QUADRANT OF LEFT BREAST: Primary | ICD-10-CM

## 2023-09-26 PROCEDURE — 99204 OFFICE O/P NEW MOD 45 MIN: CPT | Performed by: STUDENT IN AN ORGANIZED HEALTH CARE EDUCATION/TRAINING PROGRAM
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
## 2023-10-05 ENCOUNTER — PATIENT ROUNDING (BHMG ONLY) (OUTPATIENT)
Dept: SURGERY | Facility: CLINIC | Age: 63
End: 2023-10-05
Payer: COMMERCIAL

## 2023-10-05 NOTE — PROGRESS NOTES
October 5, 2023    Hello, may I speak with Lay Schaffer?    My name is Laureen      I am  with [facility]  [address]  [facility] [address] [phone].    Before we get started may I verify your date of birth? 1960    I am calling to officially welcome you to our practice and ask about your recent visit. Is this a good time to talk? no    Lvm for pt to call with questions or concerns

## 2023-10-30 ENCOUNTER — HOSPITAL ENCOUNTER (OUTPATIENT)
Dept: ULTRASOUND IMAGING | Facility: HOSPITAL | Age: 63
Discharge: HOME | End: 2023-10-30
Payer: COMMERCIAL

## 2023-10-30 ENCOUNTER — HOSPITAL ENCOUNTER (OUTPATIENT)
Dept: MAMMOGRAPHY | Facility: HOSPITAL | Age: 63
Discharge: HOME | End: 2023-10-30
Payer: COMMERCIAL

## 2023-10-30 DIAGNOSIS — N63.24 MASS OF LOWER INNER QUADRANT OF LEFT BREAST: ICD-10-CM

## 2023-10-30 PROCEDURE — 76642 ULTRASOUND BREAST LIMITED: CPT

## 2023-11-01 ENCOUNTER — TELEPHONE (OUTPATIENT)
Dept: SURGERY | Facility: CLINIC | Age: 63
End: 2023-11-01
Payer: COMMERCIAL

## 2023-11-01 DIAGNOSIS — Z12.31 ENCOUNTER FOR SCREENING MAMMOGRAM FOR HIGH-RISK PATIENT: Primary | ICD-10-CM

## 2023-11-01 NOTE — TELEPHONE ENCOUNTER
----- Message from Adamaris Pinon MD sent at 11/1/2023 10:06 AM EDT -----  Contact: [phone]  Her US was benign - just fat necrosis like we talked about in the office visit.   They recommend next imaging in August for regular screening pictures    I will see her after that. Can we make her follow up appointment after her mammogram is done?  Thanks  ----- Message -----  From: Chyna Cesar MA  Sent: 11/1/2023   9:58 AM EDT  To: Adamaris Pinon MD    Patient called office wanting to go over results. Let me know how you would like me to proceed?    Thanks  Chyna   ----- Message -----  From: Megan Paz RegSched Rep  Sent: 11/1/2023   9:41 AM EDT  To: Chyna Cesar MA    Wants to talk about test results new questions

## 2023-11-01 NOTE — TELEPHONE ENCOUNTER
m for pt to call back so I can go over results and let her know about upcoing appointments     Sent message to pt via [employer]  Good Morning   I talked to Dr Pinon about your last breast imaging results and she stated it was all benign it is just fat necrosis like you guys talked about in August.  She instructed me to get you scheduled for your annual screening mammogram and a follow up with her after that to go over results and do a breast exam in office.    Annual screening mammogram is 08/19/2024 @ 10am  Follow up with Dr Pinon on 08/26/2024 @11am    If you need to reschedule imaging please call [phone]  If you need to reschedule follow up call me [phone]    If you need anything or have any new issues please give me a call back.  Thanks   Chyna HERZOG

## 2023-11-01 NOTE — TELEPHONE ENCOUNTER
Spoke to pt and told her about upcoming appointments she said Dr Pinon already went over results     Pt stated understanding

## 2023-11-06 ENCOUNTER — OFFICE VISIT (OUTPATIENT)
Dept: FAMILY MEDICINE CLINIC | Facility: CLINIC | Age: 63
End: 2023-11-06
Payer: COMMERCIAL

## 2023-11-06 VITALS
RESPIRATION RATE: 16 BRPM | BODY MASS INDEX: 24.31 KG/M2 | DIASTOLIC BLOOD PRESSURE: 72 MMHG | HEIGHT: 62 IN | OXYGEN SATURATION: 100 % | TEMPERATURE: 98.5 F | WEIGHT: 132.1 LBS | SYSTOLIC BLOOD PRESSURE: 120 MMHG | HEART RATE: 55 BPM

## 2023-11-06 DIAGNOSIS — Z78.0 OSTEOPENIA AFTER MENOPAUSE: ICD-10-CM

## 2023-11-06 DIAGNOSIS — M85.80 OSTEOPENIA AFTER MENOPAUSE: ICD-10-CM

## 2023-11-06 DIAGNOSIS — Z00.00 ANNUAL PHYSICAL EXAM: Primary | ICD-10-CM

## 2023-11-06 DIAGNOSIS — R19.4 BOWEL HABIT CHANGES: ICD-10-CM

## 2023-11-06 DIAGNOSIS — R53.82 CHRONIC FATIGUE: ICD-10-CM

## 2023-11-06 DIAGNOSIS — Z13.220 NEED FOR LIPID SCREENING: ICD-10-CM

## 2023-11-06 DIAGNOSIS — R76.8 HEPATITIS C ANTIBODY POSITIVE IN BLOOD: ICD-10-CM

## 2023-11-06 PROBLEM — Z86.19: Status: ACTIVE | Noted: 2023-11-06
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
## 2023-11-06 PROBLEM — Z86.19: Status: ACTIVE | Noted: 2023-09-20

## 2023-11-06 NOTE — PROGRESS NOTES
"Chief Complaint  Annual Exam    Subjective    {CC  Problem List  Visit  Diagnosis   Encounters  Notes  Medications  Labs  Result Review Imaging  Media :23}     Lay Schaffer presents to [facility] for Annual Exam.    History of Present Illness     Annual Exam    Last pap smear: 8/17/2023  Last mammogram: 8/17/2023  DEXA: 8/2022 and osteopenia  Last colonoscopy: 12/2020 but having some issues with fecal incontinence.  It's gotten better with stopping the colagen supplements she was taking.   Ever screened for Hepatitis C: yes and was positive with negative RNA  Vaccines: flu covid and shingles are options but declines all 3.  Exercise: walking daily  Smoking status: former but only brief in her 20's  Alcohol use: on occasion  Sunscreen use: yes and just had her skin check.     Struggles with fatigue and has for a while, would like some blood work checked.  Also was having fecal incontinence issues and saw Dr. Guerra.   Review of Systems     Objective       Vital Signs:   /72 (BP Location: Left arm, Patient Position: Sitting, Cuff Size: Adult)   Pulse 55   Temp 98.5 °F (36.9 °C) (Oral)   Resp 16   Ht 157.5 cm (62\")   Wt 59.9 kg (132 lb 1.6 oz)   SpO2 100%   BMI 24.16 kg/m²     Body mass index is 24.16 kg/m².       PHQ-9 Total Score: 0     Physical Exam  Constitutional:       General: She is not in acute distress.     Appearance: Normal appearance.   HENT:      Head: Normocephalic and atraumatic.      Nose: Nose normal.   Eyes:      Conjunctiva/sclera: Conjunctivae normal.      Pupils: Pupils are equal, round, and reactive to light.   Cardiovascular:      Rate and Rhythm: Normal rate and regular rhythm.      Heart sounds: Normal heart sounds.   Pulmonary:      Effort: Pulmonary effort is normal.      Breath sounds: Normal breath sounds.   Abdominal:      General: Bowel sounds are normal.      Palpations: Abdomen is soft.   Musculoskeletal:      Cervical back: Neck " supple.   Skin:     General: Skin is warm.   Neurological:      General: No focal deficit present.      Mental Status: She is alert.      Gait: Gait normal.   Psychiatric:         Behavior: Behavior normal.          Result Review  Data Reviewed:{ Labs  Result Review  Imaging  Med Tab  Media :23}            Discussed healthy diet, exercise, adequate sleep, cancer screening, immunizations and preventative care. Annual eye exam and routine dental cleaning encouraged.        Assessment and Plan {CC Problem List  Visit Diagnosis  ROS  Review (Popup)  Health Maintenance  Quality  BestPractice  Medications  SmartSets  SnapShot Encounters  Media :23}   Diagnoses and all orders for this visit:    1. Annual physical exam (Primary)    2. Osteopenia after menopause  -     Vitamin D,25-Hydroxy; Future    3. Chronic fatigue  -     Comprehensive Metabolic Panel; Future  -     CBC & Differential; Future  -     TSH; Future  -     Vitamin B12; Future    4. Need for lipid screening  -     Lipid Panel; Future    5. Bowel habit changes  Assessment & Plan:  Seems to have resolved with stopping collagen    Orders:  -     CBC & Differential; Future  -     TSH; Future    6. Hepatitis C antibody positive in blood  Assessment & Plan:  In 2016 withy apparent clearing with negative RNA          Patient Instructions   I have ordered lab tests today.  You should receive a phone call or a [employer] message with those results.  If you have not heard from us in 7-10 days, please call the office.      I did encourage Flu, COVID and Shingles vaccines.    Continue your exercise and regular follow ups.    We'll do a DEXA again next year.        No follow-ups on file.    Esme Hatch MD

## 2023-11-06 NOTE — PATIENT INSTRUCTIONS
I have ordered lab tests today.  You should receive a phone call or a [employer] message with those results.  If you have not heard from us in 7-10 days, please call the office.      I did encourage Flu, COVID and Shingles vaccines.    Continue your exercise and regular follow ups.    We'll do a DEXA again next year.

## 2023-11-28 ENCOUNTER — TELEPHONE (OUTPATIENT)
Dept: FAMILY MEDICINE CLINIC | Facility: CLINIC | Age: 63
End: 2023-11-28
Payer: COMMERCIAL

## 2024-05-24 ENCOUNTER — OFFICE VISIT (OUTPATIENT)
Dept: FAMILY MEDICINE CLINIC | Facility: CLINIC | Age: 64
End: 2024-05-24
Payer: COMMERCIAL

## 2024-05-24 VITALS
OXYGEN SATURATION: 98 % | WEIGHT: 128.8 LBS | BODY MASS INDEX: 23.7 KG/M2 | SYSTOLIC BLOOD PRESSURE: 129 MMHG | RESPIRATION RATE: 16 BRPM | HEART RATE: 48 BPM | DIASTOLIC BLOOD PRESSURE: 84 MMHG | HEIGHT: 62 IN

## 2024-05-24 DIAGNOSIS — Z86.19: ICD-10-CM

## 2024-05-24 DIAGNOSIS — E53.8 B12 DEFICIENCY: ICD-10-CM

## 2024-05-24 DIAGNOSIS — R20.2 PARESTHESIA OF BOTH FEET: ICD-10-CM

## 2024-05-24 DIAGNOSIS — M79.672 PAIN IN LEFT FOOT: ICD-10-CM

## 2024-05-24 DIAGNOSIS — R76.0 ELEVATED ANTIBODY LEVELS: Primary | ICD-10-CM

## 2024-05-24 PROCEDURE — 99214 OFFICE O/P EST MOD 30 MIN: CPT | Performed by: FAMILY MEDICINE

## 2024-05-24 NOTE — PATIENT INSTRUCTIONS
I have ordered lab tests today.  You should receive a phone call or a ePatientFindert message with those results.  If you have not heard from us in 7-10 days, please call the office.      We talked about stress and it could play a part in some symptoms but we're checking on B12 and thyroid too.    If your test shows Lyme, we can do a round of doxycycline and that should take care of it.

## 2024-05-24 NOTE — PROGRESS NOTES
"Subjective     Lay Schaffer is a 63 y.o. female who presents with   Chief Complaint   Patient presents with    Lyme Disease     Tested positive in ER       History of Present Illness     She presented to the ER 5/18/2024 with several concerns that started after going on vacation in March to [place].  It started with some sort of a bite and then progressed to swelling in her feet and additional symptoms began after that that included , burning in feet, tingling in her arms and legs, chest pain and shortness of breath.  She was discharged with a pending Tick Panel that was negative.  She did have a positive Lyme IgM and IgG confirmatory were negative but she's very concerned.  She has a history of erlichiosis from many years ago.    Podiatry saw her and told her she actually had a tendon tear and she has to wear shoes to help that heal. She feels like her back and paresthesias could all be related.     The tingling and swelling have gone away.  She doesn't relax and she is still having a lot of stress and drama in her marriage and doesn't know if her symptoms are more stress.    She is taking B12 every day already due to a prior deficiency and has not had that checked again.          Review of Systems     Objective     /84 (BP Location: Left arm, Patient Position: Sitting, Cuff Size: Adult)   Pulse (!) 48   Resp 16   Ht 157.5 cm (62\")   Wt 58.4 kg (128 lb 12.8 oz)   SpO2 98%   Breastfeeding No   BMI 23.56 kg/m²     Physical Exam  Constitutional:       Appearance: Normal appearance.   Skin:     Findings: No rash.   Neurological:      Mental Status: She is alert.   Psychiatric:         Behavior: Behavior normal.         Thought Content: Thought content normal.         Procedures     Assessment & Plan   Diagnoses and all orders for this visit:    1. Elevated antibody levels (Primary)  -     Lyme Disease Total Antibody With Reflex to Immunoassay; Future    2. H/O ehrlichiosis  -     Lyme Disease " Total Antibody With Reflex to Immunoassay; Future    3. Pain in left foot    4. Paresthesia of both feet  -     TSH; Future  -     Vitamin B12; Future    5. B12 deficiency  -     Vitamin B12; Future         Discussion    Patient Instructions   I have ordered lab tests today.  You should receive a phone call or a [employer] message with those results.  If you have not heard from us in 7-10 days, please call the office.      We talked about stress and it could play a part in some symptoms but we're checking on B12 and thyroid too.    If your test shows Lyme, we can do a round of doxycycline and that should take care of it.               Esme Hatch MD

## 2024-05-28 ENCOUNTER — TELEPHONE (OUTPATIENT)
Dept: FAMILY MEDICINE CLINIC | Facility: CLINIC | Age: 64
End: 2024-05-28
Payer: COMMERCIAL

## 2024-05-28 NOTE — TELEPHONE ENCOUNTER
Call from Roni Robb.   I received my results on the Lyme detection test from [facility], and I think you all did too. And I'm just very concerned because it's saying hi. So I didn't know if I needed to be on antibiotics right away or if we need to do another test because I know they can be a false positive. It could be a bacterial or a viral infection, or syphilis or HIV. And originally when all this started happening, I had pain in my toe. So I'm just very, very concerned and don't know what to do. Thank you.

## 2024-05-28 NOTE — TELEPHONE ENCOUNTER
Called Patient, advised patient that [facility] still doesn't have her results and she is calling [facility] help line to see if they can walk her through how to send the results over.

## 2024-05-28 NOTE — TELEPHONE ENCOUNTER
Called [facility]. [facility] can not find patient results at all. Patient is going to try and send results through [facility]

## 2024-05-31 NOTE — TELEPHONE ENCOUNTER
Message from Roni Robb.   Yes, I was. I seen that my results were back on [facility] last night and it was saying negative for the Lyme. I just wanna know where do we go from here? Seeing how I was having symptoms and still am. So I was just trying to find out what was going on, and I did see just a little bit ago that you all had the results as well, so if I could get someone to call me back, I would appreciate it. Thank you.

## 2024-08-19 ENCOUNTER — OFFICE VISIT (OUTPATIENT)
Dept: OBSTETRICS AND GYNECOLOGY | Facility: CLINIC | Age: 64
End: 2024-08-19
Payer: COMMERCIAL

## 2024-08-19 ENCOUNTER — PROCEDURE VISIT (OUTPATIENT)
Dept: OBSTETRICS AND GYNECOLOGY | Facility: CLINIC | Age: 64
End: 2024-08-19
Payer: COMMERCIAL

## 2024-08-19 VITALS
HEIGHT: 62 IN | BODY MASS INDEX: 23.74 KG/M2 | DIASTOLIC BLOOD PRESSURE: 72 MMHG | SYSTOLIC BLOOD PRESSURE: 108 MMHG | WEIGHT: 129 LBS

## 2024-08-19 DIAGNOSIS — Z78.0 POSTMENOPAUSE: ICD-10-CM

## 2024-08-19 DIAGNOSIS — Z01.419 WOMEN'S ANNUAL ROUTINE GYNECOLOGICAL EXAMINATION: Primary | ICD-10-CM

## 2024-08-19 DIAGNOSIS — R14.0 BLOATING: ICD-10-CM

## 2024-08-19 DIAGNOSIS — Z12.31 VISIT FOR SCREENING MAMMOGRAM: Primary | ICD-10-CM

## 2024-08-19 DIAGNOSIS — N95.0 PMB (POSTMENOPAUSAL BLEEDING): ICD-10-CM

## 2024-08-19 LAB
BILIRUB BLD-MCNC: NEGATIVE MG/DL
GLUCOSE UR STRIP-MCNC: NEGATIVE MG/DL
KETONES UR QL: NEGATIVE
LEUKOCYTE EST, POC: NEGATIVE
NITRITE UR-MCNC: NEGATIVE MG/ML
PH UR: 6 [PH] (ref 5–8)
PROT UR STRIP-MCNC: NEGATIVE MG/DL
RBC # UR STRIP: NEGATIVE /UL
SP GR UR: 1.02 (ref 1–1.03)
UROBILINOGEN UR QL: NORMAL

## 2024-08-19 PROCEDURE — 99396 PREV VISIT EST AGE 40-64: CPT | Performed by: NURSE PRACTITIONER

## 2024-08-19 PROCEDURE — 99213 OFFICE O/P EST LOW 20 MIN: CPT | Performed by: NURSE PRACTITIONER

## 2024-08-19 PROCEDURE — 77063 BREAST TOMOSYNTHESIS BI: CPT | Performed by: NURSE PRACTITIONER

## 2024-08-19 PROCEDURE — 77067 SCR MAMMO BI INCL CAD: CPT | Performed by: NURSE PRACTITIONER

## 2024-08-19 PROCEDURE — 81002 URINALYSIS NONAUTO W/O SCOPE: CPT | Performed by: NURSE PRACTITIONER
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
## 2024-08-22 LAB
CONV .: NORMAL
CYTOLOGIST CVX/VAG CYTO: NORMAL
CYTOLOGY CVX/VAG DOC CYTO: NORMAL
CYTOLOGY CVX/VAG DOC THIN PREP: NORMAL
DX ICD CODE: NORMAL
Lab: NORMAL
OTHER STN SPEC: NORMAL
STAT OF ADQ CVX/VAG CYTO-IMP: NORMAL

## 2024-08-23 LAB
A VAGINAE DNA VAG QL NAA+PROBE: NORMAL SCORE
BVAB2 DNA VAG QL NAA+PROBE: NORMAL SCORE
C ALBICANS DNA VAG QL NAA+PROBE: NEGATIVE
C GLABRATA DNA VAG QL NAA+PROBE: NEGATIVE
C TRACH DNA SPEC QL NAA+PROBE: NEGATIVE
MEGA1 DNA VAG QL NAA+PROBE: NORMAL SCORE
N GONORRHOEA DNA VAG QL NAA+PROBE: NEGATIVE
T VAGINALIS DNA VAG QL NAA+PROBE: NEGATIVE
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
## 2024-08-26 ENCOUNTER — OFFICE VISIT (OUTPATIENT)
Dept: SURGERY | Facility: CLINIC | Age: 64
End: 2024-08-26
Payer: COMMERCIAL

## 2024-08-26 VITALS
WEIGHT: 130 LBS | BODY MASS INDEX: 23.92 KG/M2 | HEIGHT: 62 IN | HEART RATE: 70 BPM | OXYGEN SATURATION: 97 % | DIASTOLIC BLOOD PRESSURE: 82 MMHG | SYSTOLIC BLOOD PRESSURE: 122 MMHG

## 2024-08-26 DIAGNOSIS — N60.02 CYST OF LEFT BREAST: Primary | ICD-10-CM

## 2024-08-26 PROCEDURE — 99213 OFFICE O/P EST LOW 20 MIN: CPT | Performed by: NURSE PRACTITIONER

## 2024-10-02 ENCOUNTER — OFFICE VISIT (OUTPATIENT)
Dept: FAMILY MEDICINE CLINIC | Facility: CLINIC | Age: 64
End: 2024-10-02
Payer: COMMERCIAL

## 2024-10-02 VITALS
WEIGHT: 131 LBS | SYSTOLIC BLOOD PRESSURE: 126 MMHG | BODY MASS INDEX: 24.11 KG/M2 | DIASTOLIC BLOOD PRESSURE: 74 MMHG | HEIGHT: 62 IN

## 2024-10-02 DIAGNOSIS — R59.0 CERVICAL LYMPHADENOPATHY: Primary | ICD-10-CM

## 2024-10-02 PROCEDURE — 99214 OFFICE O/P EST MOD 30 MIN: CPT | Performed by: FAMILY MEDICINE

## 2024-10-08 ENCOUNTER — HOSPITAL ENCOUNTER (OUTPATIENT)
Facility: HOSPITAL | Age: 64
Discharge: HOME | End: 2024-10-08
Admitting: FAMILY MEDICINE
Payer: COMMERCIAL

## 2024-10-08 DIAGNOSIS — R59.0 CERVICAL LYMPHADENOPATHY: ICD-10-CM

## 2024-10-08 PROCEDURE — 76536 US EXAM OF HEAD AND NECK: CPT

## 2024-10-09 ENCOUNTER — TELEPHONE (OUTPATIENT)
Dept: FAMILY MEDICINE CLINIC | Facility: CLINIC | Age: 64
End: 2024-10-09

## 2024-10-09 DIAGNOSIS — R93.89 ABNORMAL FINDING ON IMAGING: Primary | ICD-10-CM

## 2024-10-09 RX ORDER — BUSPIRONE HYDROCHLORIDE 10 MG/1
TABLET ORAL
Qty: 60 TABLET | Refills: 1 | Status: SHIPPED | OUTPATIENT
Start: 2024-10-09

## 2024-10-09 NOTE — PROGRESS NOTES
Called patient, verified full name and date of birth.    Patient is very distraught with abnormal imaging results.    Counseled patient on stepwise approach to next steps, starting with obtaining further imaging and referring for guided biopsy.

## 2024-10-09 NOTE — PROGRESS NOTES
10/15/24 0002   Pre-Procedure Phone Call   Procedure Time Verified Yes   Arrival Time 0730   Procedure Location Verified Yes   Medical History Reviewed No   NPO Status Reinforced Yes   Ride and Caregiver Arranged Yes   Patient Knows to Bring Current Medications   (No changes in medications since last reviewed.)   Bring Outside Films Requested   (US head,neck,ST 10/8/24 in PACS)

## 2024-10-09 NOTE — TELEPHONE ENCOUNTER
Caller: Lay Trujillo    Relationship: Self    Best call back number:     What is the best time to reach you:     Who are you requesting to speak with (clinical staff, provider,  specific staff member): DR BLOCK    Do you know the name of the person who called:     What was the call regarding: PATIENT IS CALLING IN STATING THAT SHE HAS RECEIVED HER TEST RESULTS FROM HER ULTRASOUND OF HER NECK AND SHE WANTS TO BE CALLED AS SOON AS POSSIBLE BY DR RODRIGUEZ TO DISCUSS IN DETAIL.     Is it okay if the provider responds through MyChart:

## 2024-10-10 ENCOUNTER — TELEPHONE (OUTPATIENT)
Dept: FAMILY MEDICINE CLINIC | Facility: CLINIC | Age: 64
End: 2024-10-10
Payer: COMMERCIAL

## 2024-10-10 DIAGNOSIS — R93.89 ABNORMAL FINDING ON IMAGING: Primary | ICD-10-CM

## 2024-10-10 NOTE — TELEPHONE ENCOUNTER
Caller: [facility]    Relationship: Other    Best call back number: [phone]    What orders are you requesting (i.e. lab or imaging): US GUIDED LYMPH NODE BIOPSY    In what timeframe would the patient need to come in: 10/15/24    Where will you receive your lab/imaging services: [facility]    Additional notes: RADIOLOGY REQUESTED ORDERS UPDATED TO US GUIDED.

## 2024-10-14 NOTE — PROGRESS NOTES
10/15/24 0003   Pre-Procedure Phone Call   Procedure Time Verified Yes   Arrival Time 0830   Procedure Location Verified Yes   Medical History Reviewed No   NPO Status Reinforced Yes   Ride and Caregiver Arranged Yes   Patient Knows to Bring Current Medications   (No changes in medications since last reviewed)   Bring Outside Films Requested   (US head,neck,ST 10/8/24 in PACS)

## 2024-10-15 ENCOUNTER — HOSPITAL ENCOUNTER (OUTPATIENT)
Dept: CT IMAGING | Facility: HOSPITAL | Age: 64
Discharge: HOME | End: 2024-10-15
Payer: COMMERCIAL

## 2024-10-15 ENCOUNTER — HOSPITAL ENCOUNTER (OUTPATIENT)
Dept: ULTRASOUND IMAGING | Facility: HOSPITAL | Age: 64
Discharge: HOME | End: 2024-10-15
Payer: COMMERCIAL

## 2024-10-15 VITALS
HEIGHT: 63 IN | SYSTOLIC BLOOD PRESSURE: 115 MMHG | HEART RATE: 56 BPM | RESPIRATION RATE: 18 BRPM | TEMPERATURE: 97.8 F | BODY MASS INDEX: 22.5 KG/M2 | WEIGHT: 127 LBS | DIASTOLIC BLOOD PRESSURE: 77 MMHG | OXYGEN SATURATION: 100 %

## 2024-10-15 VITALS
HEART RATE: 59 BPM | OXYGEN SATURATION: 99 % | SYSTOLIC BLOOD PRESSURE: 113 MMHG | DIASTOLIC BLOOD PRESSURE: 73 MMHG | RESPIRATION RATE: 18 BRPM

## 2024-10-15 DIAGNOSIS — R93.89 ABNORMAL FINDING ON IMAGING: ICD-10-CM

## 2024-10-15 PROCEDURE — 76942 ECHO GUIDE FOR BIOPSY: CPT

## 2024-10-15 PROCEDURE — 25510000001 IOPAMIDOL 61 % SOLUTION: Performed by: FAMILY MEDICINE

## 2024-10-15 PROCEDURE — 25010000002 LIDOCAINE 1 % SOLUTION: Performed by: RADIOLOGY

## 2024-10-15 PROCEDURE — 71260 CT THORAX DX C+: CPT

## 2024-10-15 PROCEDURE — 88305 TISSUE EXAM BY PATHOLOGIST: CPT | Performed by: FAMILY MEDICINE

## 2024-10-15 PROCEDURE — 70492 CT SFT TSUE NCK W/O & W/DYE: CPT

## 2024-10-15 PROCEDURE — 88342 IMHCHEM/IMCYTCHM 1ST ANTB: CPT | Performed by: FAMILY MEDICINE

## 2024-10-15 RX ORDER — ALPRAZOLAM 0.5 MG
0.5 TABLET ORAL ONCE
Status: COMPLETED | OUTPATIENT
Start: 2024-10-15 | End: 2024-10-15

## 2024-10-15 RX ORDER — CLINDAMYCIN HCL 300 MG
CAPSULE ORAL
COMMUNITY
Start: 2024-10-08

## 2024-10-15 RX ORDER — LIDOCAINE HYDROCHLORIDE 10 MG/ML
10 INJECTION, SOLUTION INFILTRATION; PERINEURAL ONCE
Status: COMPLETED | OUTPATIENT
Start: 2024-10-15 | End: 2024-10-15

## 2024-10-15 RX ORDER — IOPAMIDOL 612 MG/ML
75 INJECTION, SOLUTION INTRAVASCULAR
Status: COMPLETED | OUTPATIENT
Start: 2024-10-15 | End: 2024-10-15

## 2024-10-15 RX ADMIN — IOPAMIDOL 75 ML: 612 INJECTION, SOLUTION INTRAVENOUS at 10:59

## 2024-10-15 RX ADMIN — LIDOCAINE HYDROCHLORIDE 3 ML: 10 INJECTION, SOLUTION INFILTRATION; PERINEURAL at 09:57

## 2024-10-15 RX ADMIN — ALPRAZOLAM 0.5 MG: 0.5 TABLET ORAL at 09:19

## 2024-10-15 NOTE — DISCHARGE INSTRUCTIONS
EDUCATION /DISCHARGE INSTRUCTIONS  CT/US guided biopsy:  A biopsy is a procedure done to remove tissue for further analysis.  Before images are taken to locate the target area.  Images can be obtained using ultrasound, CT or MRI.  A physician will clean your skin with antiseptic soap, place a sterile towel around the site and administer a local anesthetic to numb the area.  The physician will then insert a special needle.  Sometimes images are taken of the needle after it is inserted to ensure the needle is in the correct area to be biopsied.   A sample is obtained and sent to the laboratory for study.  Occasionally the laboratory is unable to make a diagnosis from the sample and the procedure may need to be repeated.  Within a week the radiologist will send a report to your physician.  A pathologist will also examine the tissue and send a report.    Risks of the procedure include but are not limited to:   *  Bleeding    *  Infection   *  Puncture of surrounding organs *  Death     *  Lung collapse if the biopsy is near the chest which may require insertion of a      chest tube to re-inflate the lung if severe.    Benefits of the procedure:  Using x-ray helps to locate the area that requires a biopsy. The procedure is less invasive than a surgical procedure, there are no large incisions and it does not require anesthesia.    Alternatives to the procedure:  A biopsy can be performed surgically.  Risks of a surgical biopsy include exposure to anesthesia, infection, excessive bleeding and injury to abdominal organs.  A benefit of surgical biopsy is the ability to see the area to be biopsied and remove of a larger piece of tissue.    THIS EDUCATION INFORMATION WAS REVIEWED PRIOR TO PROCEDURE AND CONSENT.    Post Procedure:    *  Expect the biopsy site may be tender up to one week.    *  Rest today (no pushing pulling or straining).   *  Slowly increase activity tomorrow.    *  If you received sedation do not drive for  24 hours.   *  Keep dressing clean and dry.   *  Leave dressing on puncture site for 24 hours.    *  You may shower when dressing removed.  Call your doctor if experiencing:   *  Signs of infection such as redness, swelling, excessive pain and / or foul        smelling drainage from the puncture site.   *  Chills or fever over 101 degrees (by mouth).   *  Unrelieved pain.   *  Any new or severe symptoms.   *  If experiencing sudden / severe shortness of breath or chest pain go to the       nearest emergency room.   Following the procedure:     Follow-up with the ordering physician as directed.    Continue to take other medications as directed by your physician unless    otherwise instructed.      If you have any concerns please call the Radiology Nurses Desk at [phone].  You are the most important factor in your recovery.  Follow the above instructions carefully.

## 2024-10-15 NOTE — TELEPHONE ENCOUNTER
May states they have received the orders.    She stated that patient didn't know why she was getting the CT. I explained that Dr. Gustafson never finished her note from 10/2 so I have no clue. She said that if the patient doesn't want the CT, then they will cancel

## 2024-10-16 ENCOUNTER — TELEPHONE (OUTPATIENT)
Dept: INTERVENTIONAL RADIOLOGY/VASCULAR | Facility: HOSPITAL | Age: 64
End: 2024-10-16
Payer: COMMERCIAL

## 2024-10-16 DIAGNOSIS — I89.9 LYMPH NODE DISORDER: Primary | ICD-10-CM

## 2024-10-16 LAB
CYTO UR: NORMAL
LAB AP CASE REPORT: NORMAL
LAB AP CLINICAL INFORMATION: NORMAL
LAB AP DIAGNOSIS COMMENT: NORMAL
LAB AP INTRADEPARTMENTAL CONSULT: NORMAL
LAB AP SPECIAL STAINS: NORMAL
PATH REPORT.ADDENDUM SPEC: NORMAL
PATH REPORT.FINAL DX SPEC: NORMAL
PATH REPORT.GROSS SPEC: NORMAL

## 2024-10-17 ENCOUNTER — TELEPHONE (OUTPATIENT)
Dept: OBSTETRICS AND GYNECOLOGY | Facility: CLINIC | Age: 64
End: 2024-10-17
Payer: COMMERCIAL

## 2024-10-17 PROBLEM — R59.0 CERVICAL LYMPHADENOPATHY: Status: ACTIVE | Noted: 2024-10-17

## 2024-10-17 NOTE — PROGRESS NOTES
"Chief Complaint  Facial Swelling (Right side,few days ago)    Subjective        Lay Schaffer presents to [facility]  History of Present Illness  63 yo f presents with several days of R sided neck and facial swelling. No fevers or chills. Thought it might have been due to a cold. No difficulty swallowing or obstructive symptoms.  Facial Swelling        Objective   Vital Signs:  /74   Ht 157.5 cm (62.01\")   Wt 59.4 kg (131 lb)   BMI 23.95 kg/m²   Estimated body mass index is 23.95 kg/m² as calculated from the following:    Height as of this encounter: 157.5 cm (62.01\").    Weight as of this encounter: 59.4 kg (131 lb).    BMI is within normal parameters. No other follow-up for BMI required.      Physical Exam  Constitutional:       Appearance: Normal appearance.   HENT:      Head: Normocephalic and atraumatic.      Nose: Nose normal.      Mouth/Throat:      Mouth: Mucous membranes are moist.   Eyes:      General: Lids are normal.      Conjunctiva/sclera: Conjunctivae normal.   Cardiovascular:      Rate and Rhythm: Normal rate and regular rhythm.      Heart sounds: Normal heart sounds.   Pulmonary:      Effort: Pulmonary effort is normal.      Breath sounds: Normal breath sounds.   Musculoskeletal:      Cervical back: No tenderness.   Lymphadenopathy:      Cervical: Cervical adenopathy present.   Skin:     General: Skin is warm and dry.   Neurological:      General: No focal deficit present.      Mental Status: She is alert and oriented to person, place, and time.      Gait: Gait is intact.   Psychiatric:         Mood and Affect: Mood normal.         Behavior: Behavior normal.         Thought Content: Thought content normal.        Result Review :                Assessment and Plan   Diagnoses and all orders for this visit:    1. Cervical lymphadenopathy (Primary)  Assessment & Plan:  Lymph node is immobile and firm, needs imaging. US ordered.    Check rpr, cbc, " cmp.    Orders:  -     Cancel: US Head Neck Soft Tissue; Future  -     US Head Neck Soft Tissue; Future  -     RPR Qualitative with Reflex to Quant; Future  -     CBC w AUTO Differential; Future  -     Comprehensive metabolic panel; Future             Follow Up   No follow-ups on file.  Patient was given instructions and counseling regarding her condition or for health maintenance advice. Please see specific information pulled into the AVS if appropriate.

## 2024-10-17 NOTE — TELEPHONE ENCOUNTER
Cristin I called pt she had a question why the HPV was not ran on her pap and previous paps. I did advise that when the paps are negative they do not run the HPV as the pap is negative. She was just diagnosised with HPV 16 of the throat and asked if you could call her to speak to her further on why HPV was not checked. I told her I would send you the message.   Thank you.

## 2024-10-17 NOTE — TELEPHONE ENCOUNTER
Caller: Lay Trujillo    Relationship: Self    Best call back number: [phone]      Who are you requesting to speak with (clinical staff,JOSE SOUZA      What was the call regarding: PATIENT ADVISED THAT SHE HAS A QUESTION REGARDING HER PAP RESULTS, WOULD LIKE TO DISCUSS, PLEASE ASSIST.     .”

## 2024-10-17 NOTE — TELEPHONE ENCOUNTER
I returned Lay Schaffer's call. Discussed recommendations for HPV testing, no longer indicated after hysterectomy if no prior abnormal pap smears. Discussed HPV in detail. She has upcoming appt with ENT and Oncology. Offered to collect HPV testing if oncology recommends.    Cristin Finley, APRN  10/17/24  11:40 am

## 2024-10-21 ENCOUNTER — TELEPHONE (OUTPATIENT)
Dept: FAMILY MEDICINE CLINIC | Facility: CLINIC | Age: 64
End: 2024-10-21

## 2024-10-21 NOTE — TELEPHONE ENCOUNTER
Caller: Lay Trujillo    Relationship: Self    Best call back number: [phone]    Caller requesting test results:     Additional notes: PT REQUESTING TO SPEAK WITH DR. SHEN ABOUT TEST RESULTS. I ASKED PT WHAT KIND OF TEST IT WAS AND SHE SAID SHE DIDN'T KNOW.

## 2024-10-22 ENCOUNTER — TELEPHONE (OUTPATIENT)
Dept: FAMILY MEDICINE CLINIC | Facility: CLINIC | Age: 64
End: 2024-10-22
Payer: COMMERCIAL

## 2024-10-22 NOTE — TELEPHONE ENCOUNTER
Pt  is wanting more information regarding results from 10/15, states that there was more testing done and would like to know what's going on

## 2024-10-23 NOTE — TELEPHONE ENCOUNTER
Called patient, verified full name and date of birth.    Discussed with patient that next step in evaluation/management is being seen by ENT.

## 2024-11-08 ENCOUNTER — OFFICE VISIT (OUTPATIENT)
Dept: FAMILY MEDICINE CLINIC | Facility: CLINIC | Age: 64
End: 2024-11-08
Payer: COMMERCIAL

## 2024-11-08 VITALS
SYSTOLIC BLOOD PRESSURE: 115 MMHG | WEIGHT: 125.4 LBS | DIASTOLIC BLOOD PRESSURE: 76 MMHG | RESPIRATION RATE: 16 BRPM | HEIGHT: 63 IN | HEART RATE: 57 BPM | BODY MASS INDEX: 22.22 KG/M2

## 2024-11-08 DIAGNOSIS — C77.0 SECONDARY MALIGNANT NEOPLASM OF LYMPH NODES OF HEAD, FACE, OR NECK WITH UNKNOWN PRIMARY SITE: ICD-10-CM

## 2024-11-08 DIAGNOSIS — Z00.00 ANNUAL PHYSICAL EXAM: Primary | ICD-10-CM

## 2024-11-08 DIAGNOSIS — Z13.220 NEED FOR LIPID SCREENING: ICD-10-CM

## 2024-11-08 DIAGNOSIS — Z78.0 OSTEOPENIA AFTER MENOPAUSE: ICD-10-CM

## 2024-11-08 DIAGNOSIS — R73.9 HYPERGLYCEMIA: ICD-10-CM

## 2024-11-08 DIAGNOSIS — M85.80 OSTEOPENIA AFTER MENOPAUSE: ICD-10-CM

## 2024-11-08 DIAGNOSIS — E53.8 B12 DEFICIENCY: ICD-10-CM

## 2024-11-08 DIAGNOSIS — C80.1 SECONDARY MALIGNANT NEOPLASM OF LYMPH NODES OF HEAD, FACE, OR NECK WITH UNKNOWN PRIMARY SITE: ICD-10-CM

## 2024-11-08 DIAGNOSIS — B97.7 HPV IN FEMALE: ICD-10-CM

## 2024-11-08 RX ORDER — ALPRAZOLAM 0.25 MG/1
0.25 TABLET ORAL
COMMUNITY
Start: 2024-11-06 | End: 2025-11-06

## 2024-11-08 NOTE — PROGRESS NOTES
"Chief Complaint  Annual Exam    Subjective    {CC  Problem List  Visit  Diagnosis   Encounters  Notes  Medications  Labs  Result Review Imaging  Media :23}     Lay Schaffer presents to [facility] [place] for Annual Exam.    History of Present Illness     Annual Exam    She has been dealing with a neck mass that appeared in September.  Biopsy was dysplastic.  She has seen Dr. Scott (ENT) as well as Dr. Live (reconstruction) and  Dr. Guerrero (Rad Onc).  She is going for a PET scan tomorrow.  [facility] is working with her.    She is very scared and worried.  Dr. Guerrero did give her some Xanax to use as needed because she can't sleep.    Last pap smear: 8/19/2024  Last mammogram: 8/2024  DEXA: 8/2022 and it's time but has a lot going on.   Last colonoscopy: 2020  Ever screened for Hepatitis C: yes  Vaccines: due covid and flu and Shingles  Exercise: typically but not right now due to work up  Smoking status: distant   Alcohol use: a glass of wine on occasion but none currently  Sunscreen use: yes and goes to dermatology annually at [facility] and went last in August.     Review of Systems     Objective       Vital Signs:   /76 (BP Location: Right arm, Patient Position: Sitting, Cuff Size: Adult)   Pulse 57   Resp 16   Ht 158.8 cm (62.5\")   Wt 56.9 kg (125 lb 6.4 oz)   BMI 22.57 kg/m²     Body mass index is 22.57 kg/m².      PHQ-9 Total Score:      Physical Exam  Constitutional:       General: She is not in acute distress.     Appearance: Normal appearance.   HENT:      Head: Normocephalic and atraumatic.      Nose: Nose normal.   Eyes:      Conjunctiva/sclera: Conjunctivae normal.      Pupils: Pupils are equal, round, and reactive to light.   Cardiovascular:      Rate and Rhythm: Normal rate and regular rhythm.      Heart sounds: Normal heart sounds.   Pulmonary:      Effort: Pulmonary effort is normal.      Breath sounds: Normal breath sounds.   Abdominal:      General: Bowel " sounds are normal.      Palpations: Abdomen is soft.   Lymphadenopathy:      Cervical: Cervical adenopathy (2 cm right submandibular mass) present.   Skin:     General: Skin is warm.      Comments: Right sided lower lip with small abrasion   Neurological:      General: No focal deficit present.      Mental Status: She is alert.      Gait: Gait normal.   Psychiatric:         Behavior: Behavior normal.        Result Review  Data Reviewed:{ Labs  Result Review  Imaging  Med Tab  Media :23}             Discussed healthy diet, exercise, adequate sleep, cancer screening, immunizations and preventative care. Annual eye exam and routine dental cleaning encouraged.        Assessment and Plan {CC Problem List  Visit Diagnosis  ROS  Review (Popup)  Message Systems Maintenance  Quality  BestPractice  Medications  SmartSets  SnapShot Encounters  Media :23}   Diagnoses and all orders for this visit:    1. Annual physical exam (Primary)    2. 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 Hyperglycemia  -     Hemoglobin A1c; Future    4. Need for lipid screening  -     Comprehensive Metabolic Panel; Future  -     Lipid Panel; Future    5. HPV in female    6. B12 deficiency  -     Vitamin B12; Future    7. Osteopenia after menopause  -     Vitamin D,25-Hydroxy; Future      BMI is within normal parameters. No other follow-up for BMI required.       Patient Instructions   Asked Dr. Choudhary on Wednesday if you should get vaccinations before your treatments.  I don't think we should do them today with the PET scan tomorrow.    I'll be following your results.    I have ordered lab tests today.  You should receive a phone call or a [employer] message with those results.  If you have not heard from us in 7-10 days, please call the office.      We'll plan your Dexa scan for after your cancer treatment.         No follow-ups on file.    Esme Hatch MD

## 2024-11-08 NOTE — PATIENT INSTRUCTIONS
Asked Dr. Choudhary on Wednesday if you should get vaccinations before your treatments.  I don't think we should do them today with the PET scan tomorrow.    I'll be following your results.    I have ordered lab tests today.  You should receive a phone call or a Masher message with those results.  If you have not heard from us in 7-10 days, please call the office.      We'll plan your Dexa scan for after your cancer treatment.

## 2024-11-20 ENCOUNTER — TELEPHONE (OUTPATIENT)
Dept: FAMILY MEDICINE CLINIC | Facility: CLINIC | Age: 64
End: 2024-11-20
Payer: COMMERCIAL

## 2024-11-20 NOTE — TELEPHONE ENCOUNTER
Caller: Lay Trujillo    Relationship: Self    Best call back number: [phone]     What test was performed: BLOOD WORK    When was the test performed: 11/8/24    Where was the test performed: [facility]    Additional notes: PATIENT HAD LABS DONE AFTER HER PHYSICAL BUT HAD NOT RECEIVED RESULTS

## 2025-01-06 ENCOUNTER — READMISSION MANAGEMENT (OUTPATIENT)
Dept: CALL CENTER | Facility: HOSPITAL | Age: 65
End: 2025-01-06
Payer: COMMERCIAL

## 2025-01-06 NOTE — OUTREACH NOTE
Prep Survey      Flowsheet Row Responses   [facility] facility patient discharged from? Non-[facility]   Is LACE score < 7 ? Non-BH Discharge   Eligibility [facility]   Date of Admission 01/04/25   Date of Discharge 01/06/25   Discharge Disposition Home or Self Care   Discharge diagnosis Acute saddle pulmonary embolism with acute cor pulmonale   Does the patient have one of the following disease processes/diagnoses(primary or secondary)? Other   Does the patient have Home health ordered? No   Prep survey completed? Yes            Tracy LIAO - Registered Nurse

## 2025-01-07 ENCOUNTER — TRANSITIONAL CARE MANAGEMENT TELEPHONE ENCOUNTER (OUTPATIENT)
Dept: CALL CENTER | Facility: HOSPITAL | Age: 65
End: 2025-01-07
Payer: COMMERCIAL

## 2025-01-07 NOTE — OUTREACH NOTE
Call Center TCM Note      Flowsheet Row Responses   [facility] [place], operated by [facility] patient discharged from? Non-  [[facility]]   Does the patient have one of the following disease processes/diagnoses(primary or secondary)? Other   TCM attempt successful? Yes   Call start time 1546   Discharge diagnosis Acute saddle pulmonary embolism with acute cor pulmonale   Meds reviewed with patient/caregiver? Yes   Is the patient having any side effects they believe may be caused by any medication additions or changes? No   Does the patient have all medications ordered at discharge? Yes   Prescription comments Apaxiban Starter Pack in place   Is the patient taking all medications as directed (includes completed medication regime)? Yes   Does the patient have an appointment with their PCP within 7-14 days of discharge? No   Nursing Interventions Patient desires to follow up with specialty only, Routed TCM call to PCP office, Patient declined scheduling/rescheduling appointment at this time   Has home health visited the patient within 72 hours of discharge? N/A   Psychosocial issues? No   Did the patient receive a copy of their discharge instructions? Yes   Nursing interventions Reviewed instructions with patient   What is the patient's perception of their health status since discharge? Improving   Is the patient/caregiver able to teach back signs and symptoms related to disease process for when to call PCP? Yes   Is the patient/caregiver able to teach back signs and symptoms related to disease process for when to call 911? Yes   Is the patient/caregiver able to teach back the hierarchy of who to call/visit for symptoms/problems? PCP, Specialist, Home health nurse, Urgent Care, ED, 911 Yes   If the patient is a current smoker, are they able to teach back resources for cessation? Not a smoker   TCM call completed? Yes   Wrap up additional comments D/C DX: acute Bilat PE,  RLE acute DVT - Pt very tired but is improving. She did keep her  radiation appt today, but MD's waiting to resume her chemo. Pt does state her RLE is greatly improved, almost back to baseline in size. No questions at this time.Pt does want PCP Dr Hatch updated, but declines TCM APPT as she has so many Onclology treatment appts and they will montior this issue for now.            Skyla Gee MA    1/7/2025, 15:52 EST
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
## 2025-06-17 ENCOUNTER — OFFICE VISIT (OUTPATIENT)
Dept: SURGERY | Facility: CLINIC | Age: 65
End: 2025-06-17
Payer: COMMERCIAL

## 2025-06-17 VITALS
DIASTOLIC BLOOD PRESSURE: 74 MMHG | BODY MASS INDEX: 22.15 KG/M2 | WEIGHT: 125 LBS | HEART RATE: 59 BPM | HEIGHT: 63 IN | OXYGEN SATURATION: 93 % | SYSTOLIC BLOOD PRESSURE: 118 MMHG

## 2025-06-17 DIAGNOSIS — N64.1 BREAST, FAT NECROSIS: Primary | ICD-10-CM

## 2025-06-17 PROCEDURE — 99213 OFFICE O/P EST LOW 20 MIN: CPT | Performed by: NURSE PRACTITIONER

## 2025-06-26 ENCOUNTER — APPOINTMENT (OUTPATIENT)
Dept: WOMENS IMAGING | Facility: HOSPITAL | Age: 65
End: 2025-06-26
Payer: COMMERCIAL

## 2025-06-26 PROCEDURE — 77065 DX MAMMO INCL CAD UNI: CPT | Performed by: RADIOLOGY

## 2025-06-26 PROCEDURE — 76642 ULTRASOUND BREAST LIMITED: CPT | Performed by: RADIOLOGY

## 2025-06-26 PROCEDURE — G0279 TOMOSYNTHESIS, MAMMO: HCPCS | Performed by: RADIOLOGY

## 2025-06-26 PROCEDURE — 77061 BREAST TOMOSYNTHESIS UNI: CPT | Performed by: RADIOLOGY

## 2025-06-30 ENCOUNTER — TELEPHONE (OUTPATIENT)
Dept: SURGERY | Facility: CLINIC | Age: 65
End: 2025-06-30
Payer: COMMERCIAL

## 2025-06-30 NOTE — TELEPHONE ENCOUNTER
Please call patient and let her know that her left diagnostic mammogram and left limited breast ultrasound are normal showing fat necrosis that is stable since prior exam and has slightly increased.  We do not surgically remove this.  Please set her up for a bilateral screening mammogram in August 2024 I will call her with those results

## 2025-08-21 ENCOUNTER — PROCEDURE VISIT (OUTPATIENT)
Dept: OBSTETRICS AND GYNECOLOGY | Facility: CLINIC | Age: 65
End: 2025-08-21
Payer: COMMERCIAL

## 2025-08-21 ENCOUNTER — OFFICE VISIT (OUTPATIENT)
Dept: OBSTETRICS AND GYNECOLOGY | Facility: CLINIC | Age: 65
End: 2025-08-21
Payer: COMMERCIAL

## 2025-08-21 VITALS
BODY MASS INDEX: 17.36 KG/M2 | SYSTOLIC BLOOD PRESSURE: 92 MMHG | WEIGHT: 98 LBS | DIASTOLIC BLOOD PRESSURE: 68 MMHG | HEIGHT: 63 IN

## 2025-08-21 DIAGNOSIS — Z01.419 WOMEN'S ANNUAL ROUTINE GYNECOLOGICAL EXAMINATION: Primary | ICD-10-CM

## 2025-08-21 DIAGNOSIS — Z78.0 POSTMENOPAUSE: ICD-10-CM

## 2025-08-21 DIAGNOSIS — Z12.31 VISIT FOR SCREENING MAMMOGRAM: Primary | ICD-10-CM

## 2025-08-21 DIAGNOSIS — B97.7 HPV IN FEMALE: ICD-10-CM

## 2025-08-21 PROCEDURE — 99397 PER PM REEVAL EST PAT 65+ YR: CPT | Performed by: NURSE PRACTITIONER

## 2025-08-27 LAB
CYTOLOGIST CVX/VAG CYTO: NORMAL
CYTOLOGY CVX/VAG DOC CYTO: NORMAL
CYTOLOGY CVX/VAG DOC THIN PREP: NORMAL
DX ICD CODE: NORMAL
HPV I/H RISK 4 DNA CVX QL PROBE+SIG AMP: NEGATIVE
Lab: NORMAL
OTHER STN SPEC: NORMAL
SERVICE CMNT-IMP: NORMAL
STAT OF ADQ CVX/VAG CYTO-IMP: NORMAL